# Patient Record
Sex: MALE | Race: WHITE | NOT HISPANIC OR LATINO | Employment: FULL TIME | URBAN - METROPOLITAN AREA
[De-identification: names, ages, dates, MRNs, and addresses within clinical notes are randomized per-mention and may not be internally consistent; named-entity substitution may affect disease eponyms.]

---

## 2018-01-05 ENCOUNTER — OFFICE VISIT (OUTPATIENT)
Dept: URGENT CARE | Facility: CLINIC | Age: 28
End: 2018-01-05

## 2018-01-05 VITALS
TEMPERATURE: 100 F | WEIGHT: 181 LBS | HEIGHT: 69 IN | SYSTOLIC BLOOD PRESSURE: 121 MMHG | HEART RATE: 88 BPM | BODY MASS INDEX: 26.81 KG/M2 | DIASTOLIC BLOOD PRESSURE: 71 MMHG | RESPIRATION RATE: 20 BRPM | OXYGEN SATURATION: 98 %

## 2018-01-05 DIAGNOSIS — R50.9 FEVER AND CHILLS: Primary | ICD-10-CM

## 2018-01-05 DIAGNOSIS — J11.1 INFLUENZA: ICD-10-CM

## 2018-01-05 LAB
CTP QC/QA: YES
FLUAV AG NPH QL: POSITIVE
FLUBV AG NPH QL: NEGATIVE

## 2018-01-05 PROCEDURE — 99203 OFFICE O/P NEW LOW 30 MIN: CPT | Mod: S$GLB,,, | Performed by: EMERGENCY MEDICINE

## 2018-01-05 PROCEDURE — 87804 INFLUENZA ASSAY W/OPTIC: CPT | Mod: 59,QW,S$GLB, | Performed by: EMERGENCY MEDICINE

## 2018-01-05 RX ORDER — BENZONATATE 200 MG/1
200 CAPSULE ORAL 3 TIMES DAILY PRN
Qty: 30 CAPSULE | Refills: 0 | Status: SHIPPED | OUTPATIENT
Start: 2018-01-05 | End: 2018-01-15

## 2018-01-05 RX ORDER — CODEINE PHOSPHATE AND GUAIFENESIN 10; 100 MG/5ML; MG/5ML
10 SOLUTION ORAL EVERY 6 HOURS PRN
Qty: 120 ML | Refills: 0 | Status: SHIPPED | OUTPATIENT
Start: 2018-01-05 | End: 2018-01-15

## 2018-01-05 NOTE — LETTER
January 5, 2018      [facility] [place] [id number] [facility] [address]  Phone: [phone]  Fax: [phone]       Patient: Noel Rodriguez   YOB: 1990  Date of Visit: 01/05/2018    To Whom It May Concern:    Frank Rodriguez  was at [facility] on 01/05/2018. He has the flu and should not return until at least 1/8/18 or no fever for 24 hours. If you have any questions or concerns, or if I can be of further assistance, please do not hesitate to contact me.    Sincerely,    Laya Murguia MD

## 2018-01-05 NOTE — PATIENT INSTRUCTIONS
Please be aware as we discussed that narcotics can be addictive. I have given you a limited quantity to take as it is needed at this time. However take it sparingly and only when needed.  Influenza (Adult)    Influenza is also called the flu. It is a viral illness that affects the air passages of your lungs. It is different from the common cold. The flu can easily be passed from one to person to another. It may be spread through the air by coughing and sneezing. Or it can be spread by touching the sick person and then touching your own eyes, nose, or mouth.  The flu starts 1 to 3 days after you are exposed to the flu virus. It may last for 1 to 2 weeks but many people feel tired or fatigued for many weeks afterward. You usually dont need to take antibiotics unless you have a complication. This might be an ear or sinus infection or pneumonia.  Symptoms of the flu may be mild or severe. They can include extreme tiredness (wanting to stay in bed all day), chills, fevers, muscle aches, soreness with eye movement, headache, and a dry, hacking cough.  Home care  Follow these guidelines when caring for yourself at home:  · Avoid being around cigarette smoke, whether yours or other peoples.  · Acetaminophen or ibuprofen will help ease your fever, muscle aches, and headache. Dont give aspirin to anyone younger than 18 who has the flu. Aspirin can harm the liver.  · Nausea and loss of appetite are common with the flu. Eat light meals. Drink 6 to 8 glasses of liquids every day. Good choices are water, sport drinks, soft drinks without caffeine, juices, tea, and soup. Extra fluids will also help loosen secretions in your nose and lungs.  · Over-the-counter cold medicines will not make the flu go away faster. But the medicines may help with coughing, sore throat, and congestion in your nose and sinuses. Dont use a decongestant if you have high blood pressure.  · Stay home until your fever has been gone for at least 24 hours  without using medicine to reduce fever.  Follow-up care  Follow up with your healthcare provider, or as advised, if you are not getting better over the next week.  If you are age 65 or older, talk with your provider about getting a pneumococcal vaccine every 5 years. You should also get this vaccine if you have chronic asthma or COPD. All adults should get a flu vaccine every fall. Ask your provider about this.  When to seek medical advice  Call your healthcare provider right away if any of these occur:  · Cough with lots of colored mucus (sputum) or blood in your mucus  · Chest pain, shortness of breath, wheezing, or trouble breathing  · Severe headache, or face, neck, or ear pain  · New rash with fever  · Fever of 100.4°F (38°C) or higher, or as directed by your healthcare provider  · Confusion, behavior change, or seizure  · Severe weakness or dizziness  · You get a new fever or cough after getting better for a few days  Date Last Reviewed: 1/1/2017  © 6119-1811 [employer]. [address] [place] [address], [place] [address]. All rights reserved. This information is not intended as a substitute for professional medical care. Always follow your healthcare professional's instructions.

## 2018-01-05 NOTE — PROGRESS NOTES
"Subjective:       Patient ID: Noel Rodriguez is a 27 y.o. male.    Vitals:  height is 5' 9" (1.753 m) and weight is 82.1 kg (181 lb). His oral temperature is 99.7 °F (37.6 °C). His blood pressure is 121/71 and his pulse is 88. His respiration is 20 and oxygen saturation is 98%.     Chief Complaint: Fever; Cough; and Sinus Problem    sxs for 72 hours and no risk factors. Tamiflu talk.       Fever    This is a new problem. The current episode started in the past 7 days. The problem occurs constantly. The problem has been unchanged. The maximum temperature noted was 99 to 99.9 F. The temperature was taken using an oral thermometer. Associated symptoms include congestion, coughing, headaches and a sore throat. Pertinent negatives include no abdominal pain, chest pain, ear pain, nausea or wheezing. He has tried acetaminophen (Dayquil, Nyquil) for the symptoms. The treatment provided no relief.   Cough   This is a new problem. The current episode started in the past 7 days. The problem has been unchanged. The problem occurs every few minutes. The cough is productive of sputum. Associated symptoms include chills, a fever, headaches, myalgias, a sore throat and shortness of breath. Pertinent negatives include no chest pain, ear pain, eye redness or wheezing. Nothing aggravates the symptoms. Treatments tried: Nyquil , Dayquil. The treatment provided no relief.   Sinus Problem   This is a new problem. The current episode started in the past 7 days. The problem is unchanged. There has been no fever (99.7). The fever has been present for less than 1 day. His pain is at a severity of 7/10. The pain is moderate. Associated symptoms include chills, congestion, coughing, headaches, shortness of breath, sneezing and a sore throat. Pertinent negatives include no ear pain or hoarse voice. Treatments tried: Nyquil and Dayquil. The treatment provided mild relief.     Review of Systems   Constitution: Positive for chills and fever. " Negative for malaise/fatigue.   HENT: Positive for congestion, sneezing and sore throat. Negative for ear pain and hoarse voice.    Eyes: Negative for discharge and redness.   Cardiovascular: Negative for chest pain, dyspnea on exertion and leg swelling.   Respiratory: Positive for cough, shortness of breath and sputum production. Negative for wheezing.    Musculoskeletal: Positive for myalgias.   Gastrointestinal: Negative for abdominal pain and nausea.   Neurological: Positive for headaches.       Objective:      Physical Exam   Constitutional: He is oriented to person, place, and time. He appears well-developed and well-nourished. He is cooperative.  Non-toxic appearance. He does not appear ill. No distress.   HENT:   Head: Normocephalic and atraumatic.   Right Ear: Hearing, tympanic membrane, external ear and ear canal normal.   Left Ear: Hearing, tympanic membrane, external ear and ear canal normal.   Nose: Nose normal. No mucosal edema, rhinorrhea or nasal deformity. No epistaxis. Right sinus exhibits no maxillary sinus tenderness and no frontal sinus tenderness. Left sinus exhibits no maxillary sinus tenderness and no frontal sinus tenderness.   Mouth/Throat: Uvula is midline, oropharynx is clear and moist and mucous membranes are normal. No trismus in the jaw. Normal dentition. No uvula swelling. No posterior oropharyngeal erythema.   Eyes: Conjunctivae and lids are normal. No scleral icterus.   Sclera clear bilat   Neck: Trachea normal, full passive range of motion without pain and phonation normal. Neck supple.   Cardiovascular: Normal rate, regular rhythm, normal heart sounds, intact distal pulses and normal pulses.    Pulmonary/Chest: Effort normal and breath sounds normal. No respiratory distress.   Abdominal: Soft. Normal appearance and bowel sounds are normal. He exhibits no distension. There is no tenderness.   Musculoskeletal: Normal range of motion. He exhibits no edema or deformity.    Neurological: He is alert and oriented to person, place, and time. He exhibits normal muscle tone. Coordination normal.   Skin: Skin is warm, dry and intact. He is not diaphoretic. No pallor.   Psychiatric: He has a normal mood and affect. His speech is normal and behavior is normal. Judgment and thought content normal. Cognition and memory are normal.   Nursing note and vitals reviewed.      Office Visit on 01/05/2018   Component Date Value Ref Range Status    Rapid Influenza A Ag 01/05/2018 Positive* Negative Final    Rapid Influenza B Ag 01/05/2018 Negative  Negative Final     Acceptable 01/05/2018 Yes   Final     Assessment:       1. Fever and chills    2. Influenza        Plan:         Fever and chills  -     POCT Influenza A/B    Influenza    Other orders  -     benzonatate (TESSALON) 200 MG capsule; Take 1 capsule (200 mg total) by mouth 3 (three) times daily as needed for Cough.  Dispense: 30 capsule; Refill: 0  -     guaifenesin-codeine 100-10 mg/5 ml (TUSSI-ORGANIDIN NR)  mg/5 mL syrup; Take 10 mLs by mouth every 6 (six) hours as needed for Cough.  Dispense: 120 mL; Refill: 0

## 2019-08-31 ENCOUNTER — OFFICE VISIT (OUTPATIENT)
Dept: URGENT CARE | Facility: CLINIC | Age: 29
End: 2019-08-31
Payer: COMMERCIAL

## 2019-08-31 VITALS
HEIGHT: 69 IN | WEIGHT: 181 LBS | HEART RATE: 77 BPM | RESPIRATION RATE: 18 BRPM | BODY MASS INDEX: 26.81 KG/M2 | TEMPERATURE: 97 F | SYSTOLIC BLOOD PRESSURE: 115 MMHG | DIASTOLIC BLOOD PRESSURE: 71 MMHG | OXYGEN SATURATION: 97 %

## 2019-08-31 DIAGNOSIS — M79.631 PAIN OF RIGHT FOREARM: Primary | ICD-10-CM

## 2019-08-31 DIAGNOSIS — M54.50 ACUTE LEFT-SIDED LOW BACK PAIN WITHOUT SCIATICA: ICD-10-CM

## 2019-08-31 DIAGNOSIS — M25.512 ACUTE PAIN OF LEFT SHOULDER: ICD-10-CM

## 2019-08-31 PROCEDURE — 73090 XR FOREARM RIGHT: ICD-10-PCS | Mod: FY,RT,S$GLB, | Performed by: RADIOLOGY

## 2019-08-31 PROCEDURE — 73090 X-RAY EXAM OF FOREARM: CPT | Mod: FY,RT,S$GLB, | Performed by: RADIOLOGY

## 2019-08-31 PROCEDURE — 99214 OFFICE O/P EST MOD 30 MIN: CPT | Mod: S$GLB,,, | Performed by: PHYSICIAN ASSISTANT

## 2019-08-31 PROCEDURE — 3008F BODY MASS INDEX DOCD: CPT | Mod: CPTII,S$GLB,, | Performed by: PHYSICIAN ASSISTANT

## 2019-08-31 PROCEDURE — 99214 PR OFFICE/OUTPT VISIT, EST, LEVL IV, 30-39 MIN: ICD-10-PCS | Mod: S$GLB,,, | Performed by: PHYSICIAN ASSISTANT

## 2019-08-31 PROCEDURE — 3008F PR BODY MASS INDEX (BMI) DOCUMENTED: ICD-10-PCS | Mod: CPTII,S$GLB,, | Performed by: PHYSICIAN ASSISTANT

## 2019-08-31 RX ORDER — NAPROXEN 500 MG/1
500 TABLET ORAL 2 TIMES DAILY WITH MEALS
Qty: 20 TABLET | Refills: 0 | Status: SHIPPED | OUTPATIENT
Start: 2019-08-31 | End: 2019-09-10

## 2019-08-31 RX ORDER — METHOCARBAMOL 500 MG/1
1000 TABLET, FILM COATED ORAL 4 TIMES DAILY PRN
Qty: 20 TABLET | Refills: 0 | Status: SHIPPED | OUTPATIENT
Start: 2019-08-31 | End: 2023-01-15

## 2019-08-31 NOTE — PROGRESS NOTES
"Subjective:       Patient ID: Neol Rodriguez is a 28 y.o. male.    Vitals:  height is 5' 9" (1.753 m) and weight is 82.1 kg (181 lb). His oral temperature is 97.3 °F (36.3 °C). His blood pressure is 115/71 and his pulse is 77. His respiration is 18 and oxygen saturation is 97%.     Chief Complaint: Arm Pain and Back Pain    Patient present today with right forearm, left shoulder, and left sided low back back pain.     Patient states arm pain start a about a month ago of gradual onset.  No injury or trauma that he knows of.  He is requesting x-ray to make sure that there is no hairline fracture.  He states that he is a  and does lift things a lot.     Back pain started a few days ago also of gradual onset.  Worse with movement-  Twisting, bending, lifting.   No radiation of pain. No incontinence.  No saddle anesthesia/ lower extremity numbness or paresthesia.      Patient also presents with left shoulder pain that began few days ago as well of gradual onset.  He states that it hurts worse to rotate and to abduct.  Denies injury or trauma.  No previous injury or surgery to this  Shoulder.     Arm Pain    The incident occurred more than 1 week ago. There was no injury mechanism. The pain is present in the right forearm. The quality of the pain is described as aching. The pain is at a severity of 6/10. Pertinent negatives include no chest pain or numbness. The symptoms are aggravated by lifting and movement. He has tried nothing for the symptoms.   Back Pain   This is a new problem. The current episode started in the past 7 days. The problem has been gradually worsening since onset. The pain is present in the lumbar spine. The quality of the pain is described as aching and cramping. The pain does not radiate. The pain is at a severity of 9/10. The symptoms are aggravated by lying down and bending. Pertinent negatives include no abdominal pain, bladder incontinence, bowel incontinence, chest pain, dysuria, fever or " numbness. He has tried nothing for the symptoms. The treatment provided no relief.       Constitution: Negative for appetite change, chills, sweating, fatigue, fever and unexpected weight change.   Neck: Negative for neck pain, neck stiffness, neck swelling, degenerative disc disease and bulging disc disease.   Cardiovascular: Negative for chest pain, palpitations and sob on exertion.   Gastrointestinal: Negative for abdominal pain and bowel incontinence.   Genitourinary: Negative for dysuria, urgency, bladder incontinence and hematuria.   Musculoskeletal: Positive for pain and back pain. Negative for trauma, joint pain, joint swelling, abnormal ROM of joint, arthritis, gout, pain with walking, muscle cramps, muscle ache and history of spine disorder.   Skin: Negative for rash.   Neurological: Negative for dizziness, coordination disturbances, numbness and tingling.       Objective:      Physical Exam   Constitutional: He is oriented to person, place, and time. He appears well-developed and well-nourished. He is cooperative.  Non-toxic appearance. He does not appear ill. No distress.   HENT:   Head: Normocephalic and atraumatic. Head is without abrasion, without contusion and without laceration.   Right Ear: Hearing, tympanic membrane, external ear and ear canal normal. No hemotympanum.   Left Ear: Hearing, tympanic membrane, external ear and ear canal normal. No hemotympanum.   Nose: Nose normal. No mucosal edema, rhinorrhea or nasal deformity. No epistaxis. Right sinus exhibits no maxillary sinus tenderness and no frontal sinus tenderness. Left sinus exhibits no maxillary sinus tenderness and no frontal sinus tenderness.   Mouth/Throat: Uvula is midline, oropharynx is clear and moist and mucous membranes are normal. No trismus in the jaw. Normal dentition. No uvula swelling. No posterior oropharyngeal erythema.   Eyes: Pupils are equal, round, and reactive to light. Conjunctivae, EOM and lids are normal. Right eye  exhibits no discharge. Left eye exhibits no discharge. No scleral icterus.   Sclera clear bilat   Neck: Trachea normal, normal range of motion, full passive range of motion without pain and phonation normal. Neck supple. No spinous process tenderness and no muscular tenderness present. No neck rigidity. No tracheal deviation present.   Cardiovascular: Normal rate, regular rhythm, normal heart sounds, intact distal pulses and normal pulses.   Pulmonary/Chest: Effort normal and breath sounds normal. No respiratory distress.   Abdominal: Soft. Normal appearance and bowel sounds are normal. He exhibits no distension, no pulsatile midline mass and no mass. There is no tenderness.   Musculoskeletal: Normal range of motion. He exhibits no edema or deformity.        Right shoulder: Normal.        Left shoulder: He exhibits pain. He exhibits normal range of motion, no tenderness, no bony tenderness, no swelling, no effusion, no crepitus, no deformity, no laceration, no spasm, normal pulse and normal strength.        Thoracic back: Normal.        Lumbar back: He exhibits pain and spasm. He exhibits normal range of motion, no tenderness, no bony tenderness, no swelling, no edema, no deformity, no laceration and normal pulse.        Back:         Arms:  Neurological: He is alert and oriented to person, place, and time. He has normal strength. No cranial nerve deficit or sensory deficit. He exhibits normal muscle tone. He displays no seizure activity. Coordination normal. GCS eye subscore is 4. GCS verbal subscore is 5. GCS motor subscore is 6.   Reflex Scores:       Achilles reflexes are 2+ on the right side and 2+ on the left side.   Sensation bilateral upper and lower extremities intact   Skin: Skin is warm, dry and intact. Capillary refill takes less than 2 seconds. No abrasion, no bruising, no burn, no ecchymosis and no laceration noted. He is not diaphoretic. No pallor.   Psychiatric: He has a normal mood and affect. His  speech is normal and behavior is normal. Judgment and thought content normal. Cognition and memory are normal.   Nursing note and vitals reviewed.        X-ray Forearm Right    Result Date: 8/31/2019  EXAMINATION: XR FOREARM RIGHT CLINICAL HISTORY: Pain in right forearm TECHNIQUE: AP and lateral views of the right forearm were performed. COMPARISON: None FINDINGS: No bone, joint or soft tissue abnormality is identified.     No fracture or dislocation.  No radiopaque foreign bodies. Electronically signed by: Andreia Roblero MD Date:    08/31/2019 Time:    13:46    Assessment:       1. Pain of right forearm    2. Acute pain of left shoulder    3. Acute left-sided low back pain without sciatica        Plan:         Pain of right forearm  -     X-Ray Forearm Right; Future; Expected date: 08/31/2019  -     Ambulatory referral to Orthopedics  -     naproxen (NAPROSYN) 500 MG tablet; Take 1 tablet (500 mg total) by mouth 2 (two) times daily with meals. for 10 days  Dispense: 20 tablet; Refill: 0    Acute pain of left shoulder  -     Ambulatory referral to Orthopedics  -     naproxen (NAPROSYN) 500 MG tablet; Take 1 tablet (500 mg total) by mouth 2 (two) times daily with meals. for 10 days  Dispense: 20 tablet; Refill: 0    Acute left-sided low back pain without sciatica  -     methocarbamol (ROBAXIN) 500 MG Tab; Take 2 tablets (1,000 mg total) by mouth 4 (four) times daily as needed. As needed for muscle spasm.  Dispense: 20 tablet; Refill: 0  -     naproxen (NAPROSYN) 500 MG tablet; Take 1 tablet (500 mg total) by mouth 2 (two) times daily with meals. for 10 days  Dispense: 20 tablet; Refill: 0      Patient Instructions     - Rest.    - Drink plenty of fluids.      - take naproxen twice a day as prescribed with food to help with pain and inflammation.  Do not take over the counter NSAIDs (aleve, advil, ibuprofen) while taking this medication.  You can take Tylenol as directed along with this medication for pain  relief.    -take Robaxin (methocarbamol)  500- 1000 mg up to 4 times daily as prescribed.  This is a muscle relaxer that can help with tension of the muscles around her spine to help reduce pain. This medicine may cause drowsiness.  Do not drive for operate heavy machinery while on this medication    - Ice for 15-20 minutes at a time for the next 24-48 hours.    - Elevate when possible.     -  I suspect that the shoulder and forearm are overuse injuries.  Try to rest as much as possible, although difficult with work.    - No heavy lifting.    - Icing a muscle strain is best for the first 24-48 hours after an injury. After that, low heat to areas of pain for 20 minutes at a time can help.  - Go to the ER for any weakness or sensation loss of the legs, or loss of bowel or bladder control.    - Follow up with your PCP or specialty clinic as directed in the next 1-2 weeks if not improved or as needed.  You can call [phone] to schedule an appointment with  orthopedics  - Go to the ER or seek medical attention immediately if you develop new or worsening symptoms.    - You must understand that you have received an Urgent Care treatment only and that you may be released before all of your medical problems are known or treated.   - You, the patient, will arrange for follow up care as instructed.   - If your condition worsens or fails to improve we recommend that you receive another evaluation at the ER immediately or contact your PCP to discuss your concerns or return here.         Shoulder Impingement Syndrome  The rotator cuff is a group of muscles and tendons that surround the shoulder joint. These muscles and tendons hold the arm in its joint. They help the shoulder move. The rotator cuff muscles and tendons can become irritated from repeated rubbing against the shoulder bone. This is called shoulder impingement syndrome or rotator cuff tendonitis.     If your case is mild, you may only need to rest the shoulder  and then do certain exercises to strengthen the muscles. You can also take anti-inflammatory medicines. Steroid injections into the shoulder can ease inflammation. But you can have only a limited number of these. If the condition gets worse, your shoulder muscles may become thin and weak. This can lead to a rotator cuff tear.  Symptoms of shoulder impingement syndrome may include:  · Shoulder pain that gets worse when you raise your arm overhead  · Weakness of the shoulder muscles when you use your arm overhead  · Popping and clicking when you move your shoulder  · Shoulder pain that wakes you up at night, especially when you sleep on the affected shoulder  · Sudden pain in your shoulder when you lift or reach  Home care  Follow these tips to take care of yourself at home:  · Avoid activities that make your pain worse. These include raising your arms overhead, repeating the same motion over and over, or lifting heavy objects.  · Dont hold your arm in one position for a long time. Keep it moving.  · Put an ice pack on the sore area for 20 minutes every 1 to 2 hours for the first day. You can make an ice pack by putting ice cubes in a plastic bag. Wrap the bag in a towel before putting it on your shoulder. A frozen bag of peas or something similar can also be used as an ice pack. Use the ice packs 3 to 4 times a day for the next 2 days. Continue using the ice to relieve of pain and swelling as needed.  · You may take acetaminophen or ibuprofen to control pain, unless another medicine was prescribed. If prednisone was prescribed, dont take anti-inflammatory medicines. If you have chronic liver or kidney disease or ever had a stomach ulcer or gastrointestinal bleeding, talk with your doctor before using these medicines.  · After your symptoms ease, you may get physical therapy or start a home exercise program. This can strengthen your shoulder muscles and help your range of motion. Talk with your doctor about what is  best for your condition.  Follow-up care  Follow up with your healthcare provider, or as advised.  When to seek medical advice  Call your healthcare provider right away if any of these occur:  · Shoulder pain that gets worse and wakes you up at night  · Your shoulder or arm swells  · Numbness, tingling, or pain that travels down the arm to the hand  · Loss of shoulder strength  · Fever or chills  Date Last Reviewed: 8/1/2016 © 2000-2017 [employer]. [address] [place] [address]. All rights reserved. This information is not intended as a substitute for professional medical care. Always follow your healthcare professional's instructions.        Understanding Lumbosacral Strain    Lumbosacral strain is a medical term for an injury that causes low back pain. The lumbosacral area (low back) is between the bottom of the ribcage and the top of the buttocks. A strain is tearing of muscles and tendons. These tears can be very small but still cause pain.  How a lumbosacral strain happens  Muscles and tendons connected to the spine can be strained in a number of ways:  · Sitting or standing in the same position for long periods of time. This can harm the low back over time. Poor posture can make low back pain more likely.  · Moving the muscles and tendons past their usual range of motion. This can cause a sudden injury. This can happen when you twist, bend over, or lift something heavy. Not using correct technique for sports or tasks like lifting can make back injury more likely.  · Accidents or falls  Lumbosacral strain can be caused by other problems, but these are less common.  Symptoms of lumbosacral strain  Symptoms may include:  · Pain in the back, often on one side  · Pain that gets worse with movement and gets better with rest  · Inability to move as freely as usual  · Swelling, slight redness, and skin warmth in the painful area  Treatment for lumbosacral strain  Low back pain often goes  away by itself within several weeks. But it often comes back. Treatment focuses on reducing pain and avoiding further injury. Bed rest is usually not recommended for low back pain. Treatments may include:  · Avoiding or changing the action that caused the problem. This helps prevent injuring the tissues again.  · Prescription or over-the-counter pain medicines. These help reduce inflammation, swelling, and pain.  · Cold or heat packs. These help reduce pain and swelling.  · Stretching and other exercises. These improve flexibility and strength.  · Physical therapy. This usually includes exercises and other treatments.  · Injections of medicine. This may relieve symptoms.  If these treatments do not relieve symptoms, your healthcare provider may order imaging tests to learn more about the problem. Sometimes you may need surgery.  Possible complications of lumbosacral strain  If the cause of the pain is not addressed, symptoms may return or get worse. Follow your healthcare providers instructions on lifestyle changes and treating your back.     When to call your healthcare provider  Call your healthcare provider right away if you have any of these:  · Fever of 100.4°F (38°C) or higher, or as directed  · Numbness, tingling, or weakness  · Problems with bowel or bladder control, or problems having sex  · Pain that does not go away, or gets worse  · New symptoms   Date Last Reviewed: 3/10/2016  © 4627-2475 [employer]. [address] [place] [address], [place] [address]. All rights reserved. This information is not intended as a substitute for professional medical care. Always follow your healthcare professional's instructions.

## 2019-08-31 NOTE — PATIENT INSTRUCTIONS
- Rest.    - Drink plenty of fluids.      - take naproxen twice a day as prescribed with food to help with pain and inflammation.  Do not take over the counter NSAIDs (aleve, advil, ibuprofen) while taking this medication.  You can take Tylenol as directed along with this medication for pain relief.    -take Robaxin (methocarbamol)  500- 1000 mg up to 4 times daily as prescribed.  This is a muscle relaxer that can help with tension of the muscles around her spine to help reduce pain. This medicine may cause drowsiness.  Do not drive for operate heavy machinery while on this medication    - Ice for 15-20 minutes at a time for the next 24-48 hours.    - Elevate when possible.     -  I suspect that the shoulder and forearm are overuse injuries.  Try to rest as much as possible, although difficult with work.    - No heavy lifting.    - Icing a muscle strain is best for the first 24-48 hours after an injury. After that, low heat to areas of pain for 20 minutes at a time can help.  - Go to the ER for any weakness or sensation loss of the legs, or loss of bowel or bladder control.    - Follow up with your PCP or specialty clinic as directed in the next 1-2 weeks if not improved or as needed.  You can call [phone] to schedule an appointment with  orthopedics  - Go to the ER or seek medical attention immediately if you develop new or worsening symptoms.    - You must understand that you have received an Urgent Care treatment only and that you may be released before all of your medical problems are known or treated.   - You, the patient, will arrange for follow up care as instructed.   - If your condition worsens or fails to improve we recommend that you receive another evaluation at the ER immediately or contact your PCP to discuss your concerns or return here.         Shoulder Impingement Syndrome  The rotator cuff is a group of muscles and tendons that surround the shoulder joint. These muscles and tendons hold the  arm in its joint. They help the shoulder move. The rotator cuff muscles and tendons can become irritated from repeated rubbing against the shoulder bone. This is called shoulder impingement syndrome or rotator cuff tendonitis.     If your case is mild, you may only need to rest the shoulder and then do certain exercises to strengthen the muscles. You can also take anti-inflammatory medicines. Steroid injections into the shoulder can ease inflammation. But you can have only a limited number of these. If the condition gets worse, your shoulder muscles may become thin and weak. This can lead to a rotator cuff tear.  Symptoms of shoulder impingement syndrome may include:  · Shoulder pain that gets worse when you raise your arm overhead  · Weakness of the shoulder muscles when you use your arm overhead  · Popping and clicking when you move your shoulder  · Shoulder pain that wakes you up at night, especially when you sleep on the affected shoulder  · Sudden pain in your shoulder when you lift or reach  Home care  Follow these tips to take care of yourself at home:  · Avoid activities that make your pain worse. These include raising your arms overhead, repeating the same motion over and over, or lifting heavy objects.  · Dont hold your arm in one position for a long time. Keep it moving.  · Put an ice pack on the sore area for 20 minutes every 1 to 2 hours for the first day. You can make an ice pack by putting ice cubes in a plastic bag. Wrap the bag in a towel before putting it on your shoulder. A frozen bag of peas or something similar can also be used as an ice pack. Use the ice packs 3 to 4 times a day for the next 2 days. Continue using the ice to relieve of pain and swelling as needed.  · You may take acetaminophen or ibuprofen to control pain, unless another medicine was prescribed. If prednisone was prescribed, dont take anti-inflammatory medicines. If you have chronic liver or kidney disease or ever had a  stomach ulcer or gastrointestinal bleeding, talk with your doctor before using these medicines.  · After your symptoms ease, you may get physical therapy or start a home exercise program. This can strengthen your shoulder muscles and help your range of motion. Talk with your doctor about what is best for your condition.  Follow-up care  Follow up with your healthcare provider, or as advised.  When to seek medical advice  Call your healthcare provider right away if any of these occur:  · Shoulder pain that gets worse and wakes you up at night  · Your shoulder or arm swells  · Numbness, tingling, or pain that travels down the arm to the hand  · Loss of shoulder strength  · Fever or chills  Date Last Reviewed: 8/1/2016  © 6396-8865 [employer]. [address] [place] [address], [place] [address]. All rights reserved. This information is not intended as a substitute for professional medical care. Always follow your healthcare professional's instructions.        Understanding Lumbosacral Strain    Lumbosacral strain is a medical term for an injury that causes low back pain. The lumbosacral area (low back) is between the bottom of the ribcage and the top of the buttocks. A strain is tearing of muscles and tendons. These tears can be very small but still cause pain.  How a lumbosacral strain happens  Muscles and tendons connected to the spine can be strained in a number of ways:  · Sitting or standing in the same position for long periods of time. This can harm the low back over time. Poor posture can make low back pain more likely.  · Moving the muscles and tendons past their usual range of motion. This can cause a sudden injury. This can happen when you twist, bend over, or lift something heavy. Not using correct technique for sports or tasks like lifting can make back injury more likely.  · Accidents or falls  Lumbosacral strain can be caused by other problems, but these are less common.  Symptoms of lumbosacral  strain  Symptoms may include:  · Pain in the back, often on one side  · Pain that gets worse with movement and gets better with rest  · Inability to move as freely as usual  · Swelling, slight redness, and skin warmth in the painful area  Treatment for lumbosacral strain  Low back pain often goes away by itself within several weeks. But it often comes back. Treatment focuses on reducing pain and avoiding further injury. Bed rest is usually not recommended for low back pain. Treatments may include:  · Avoiding or changing the action that caused the problem. This helps prevent injuring the tissues again.  · Prescription or over-the-counter pain medicines. These help reduce inflammation, swelling, and pain.  · Cold or heat packs. These help reduce pain and swelling.  · Stretching and other exercises. These improve flexibility and strength.  · Physical therapy. This usually includes exercises and other treatments.  · Injections of medicine. This may relieve symptoms.  If these treatments do not relieve symptoms, your healthcare provider may order imaging tests to learn more about the problem. Sometimes you may need surgery.  Possible complications of lumbosacral strain  If the cause of the pain is not addressed, symptoms may return or get worse. Follow your healthcare providers instructions on lifestyle changes and treating your back.     When to call your healthcare provider  Call your healthcare provider right away if you have any of these:  · Fever of 100.4°F (38°C) or higher, or as directed  · Numbness, tingling, or weakness  · Problems with bowel or bladder control, or problems having sex  · Pain that does not go away, or gets worse  · New symptoms   Date Last Reviewed: 3/10/2016  © 7567-9260 [employer]. [address] [place] [address], [place] [address]. All rights reserved. This information is not intended as a substitute for professional medical care. Always follow your healthcare professional's  instructions.

## 2020-02-17 ENCOUNTER — OFFICE VISIT (OUTPATIENT)
Dept: URGENT CARE | Facility: CLINIC | Age: 30
End: 2020-02-17
Payer: COMMERCIAL

## 2020-02-17 ENCOUNTER — HOSPITAL ENCOUNTER (EMERGENCY)
Facility: OTHER | Age: 30
Discharge: HOME | End: 2020-02-17
Attending: EMERGENCY MEDICINE
Payer: COMMERCIAL

## 2020-02-17 VITALS
WEIGHT: 180 LBS | DIASTOLIC BLOOD PRESSURE: 76 MMHG | HEIGHT: 69 IN | OXYGEN SATURATION: 98 % | HEART RATE: 69 BPM | RESPIRATION RATE: 18 BRPM | SYSTOLIC BLOOD PRESSURE: 125 MMHG | BODY MASS INDEX: 26.66 KG/M2 | TEMPERATURE: 98 F

## 2020-02-17 VITALS
RESPIRATION RATE: 16 BRPM | WEIGHT: 186 LBS | SYSTOLIC BLOOD PRESSURE: 132 MMHG | OXYGEN SATURATION: 98 % | HEART RATE: 86 BPM | DIASTOLIC BLOOD PRESSURE: 81 MMHG | BODY MASS INDEX: 27.55 KG/M2 | TEMPERATURE: 99 F | HEIGHT: 69 IN

## 2020-02-17 DIAGNOSIS — R10.9 ABDOMINAL PAIN, UNSPECIFIED ABDOMINAL LOCATION: ICD-10-CM

## 2020-02-17 DIAGNOSIS — R10.31 RIGHT LOWER QUADRANT ABDOMINAL PAIN: Primary | ICD-10-CM

## 2020-02-17 DIAGNOSIS — R11.2 NON-INTRACTABLE VOMITING WITH NAUSEA, UNSPECIFIED VOMITING TYPE: Primary | ICD-10-CM

## 2020-02-17 DIAGNOSIS — R11.0 NAUSEA: ICD-10-CM

## 2020-02-17 LAB
ALBUMIN SERPL BCP-MCNC: 4.4 G/DL (ref 3.5–5.2)
ALP SERPL-CCNC: 51 U/L (ref 55–135)
ALT SERPL W/O P-5'-P-CCNC: 25 U/L (ref 10–44)
ANION GAP SERPL CALC-SCNC: 7 MMOL/L (ref 8–16)
ANISOCYTOSIS BLD QL SMEAR: SLIGHT
AST SERPL-CCNC: 15 U/L (ref 10–40)
BASOPHILS # BLD AUTO: 0.06 K/UL (ref 0–0.2)
BASOPHILS NFR BLD: 0.8 % (ref 0–1.9)
BILIRUB SERPL-MCNC: 0.9 MG/DL (ref 0.1–1)
BILIRUB UR QL STRIP: NEGATIVE
BUN SERPL-MCNC: 9 MG/DL (ref 6–20)
CALCIUM SERPL-MCNC: 9.3 MG/DL (ref 8.7–10.5)
CHLORIDE SERPL-SCNC: 105 MMOL/L (ref 95–110)
CLARITY UR: CLEAR
CO2 SERPL-SCNC: 29 MMOL/L (ref 23–29)
COLOR UR: YELLOW
CREAT SERPL-MCNC: 1 MG/DL (ref 0.5–1.4)
DACRYOCYTES BLD QL SMEAR: NORMAL
DIFFERENTIAL METHOD: NORMAL
EOSINOPHIL # BLD AUTO: 0.2 K/UL (ref 0–0.5)
EOSINOPHIL NFR BLD: 2.8 % (ref 0–8)
ERYTHROCYTE [DISTWIDTH] IN BLOOD BY AUTOMATED COUNT: 12.6 % (ref 11.5–14.5)
EST. GFR  (AFRICAN AMERICAN): >60 ML/MIN/1.73 M^2
EST. GFR  (NON AFRICAN AMERICAN): >60 ML/MIN/1.73 M^2
GLUCOSE SERPL-MCNC: 97 MG/DL (ref 70–110)
GLUCOSE UR QL STRIP: NEGATIVE
HCT VFR BLD AUTO: 48 % (ref 40–54)
HGB BLD-MCNC: 16.7 G/DL (ref 14–18)
HGB UR QL STRIP: NEGATIVE
IMM GRANULOCYTES # BLD AUTO: 0.02 K/UL (ref 0–0.04)
IMM GRANULOCYTES NFR BLD AUTO: 0.3 % (ref 0–0.5)
KETONES UR QL STRIP: NEGATIVE
LEUKOCYTE ESTERASE UR QL STRIP: NEGATIVE
LYMPHOCYTES # BLD AUTO: 1.6 K/UL (ref 1–4.8)
LYMPHOCYTES NFR BLD: 21.2 % (ref 18–48)
MCH RBC QN AUTO: 30.4 PG (ref 27–31)
MCHC RBC AUTO-ENTMCNC: 34.8 G/DL (ref 32–36)
MCV RBC AUTO: 87 FL (ref 82–98)
MONOCYTES # BLD AUTO: 0.5 K/UL (ref 0.3–1)
MONOCYTES NFR BLD: 6.5 % (ref 4–15)
NEUTROPHILS # BLD AUTO: 5.3 K/UL (ref 1.8–7.7)
NEUTROPHILS NFR BLD: 68.4 % (ref 38–73)
NITRITE UR QL STRIP: NEGATIVE
NRBC BLD-RTO: 0 /100 WBC
PH UR STRIP: 6 [PH] (ref 5–8)
PLATELET # BLD AUTO: 240 K/UL (ref 150–350)
PLATELET BLD QL SMEAR: NORMAL
PMV BLD AUTO: 9.5 FL (ref 9.2–12.9)
POIKILOCYTOSIS BLD QL SMEAR: SLIGHT
POTASSIUM SERPL-SCNC: 4.6 MMOL/L (ref 3.5–5.1)
PROT SERPL-MCNC: 7.7 G/DL (ref 6–8.4)
PROT UR QL STRIP: NEGATIVE
RBC # BLD AUTO: 5.49 M/UL (ref 4.6–6.2)
SODIUM SERPL-SCNC: 141 MMOL/L (ref 136–145)
SP GR UR STRIP: 1.02 (ref 1–1.03)
URN SPEC COLLECT METH UR: NORMAL
UROBILINOGEN UR STRIP-ACNC: NEGATIVE EU/DL
WBC # BLD AUTO: 7.73 K/UL (ref 3.9–12.7)

## 2020-02-17 PROCEDURE — 25500020 PHARM REV CODE 255: Performed by: EMERGENCY MEDICINE

## 2020-02-17 PROCEDURE — 99285 EMERGENCY DEPT VISIT HI MDM: CPT | Mod: 25

## 2020-02-17 PROCEDURE — 25000003 PHARM REV CODE 250: Performed by: PHYSICIAN ASSISTANT

## 2020-02-17 PROCEDURE — S0028 INJECTION, FAMOTIDINE, 20 MG: HCPCS | Performed by: EMERGENCY MEDICINE

## 2020-02-17 PROCEDURE — 81003 URINALYSIS AUTO W/O SCOPE: CPT

## 2020-02-17 PROCEDURE — 25000003 PHARM REV CODE 250: Performed by: EMERGENCY MEDICINE

## 2020-02-17 PROCEDURE — 36415 COLL VENOUS BLD VENIPUNCTURE: CPT

## 2020-02-17 PROCEDURE — 80053 COMPREHEN METABOLIC PANEL: CPT

## 2020-02-17 PROCEDURE — 96361 HYDRATE IV INFUSION ADD-ON: CPT

## 2020-02-17 PROCEDURE — 99213 PR OFFICE/OUTPT VISIT, EST, LEVL III, 20-29 MIN: ICD-10-PCS | Mod: S$GLB,,, | Performed by: NURSE PRACTITIONER

## 2020-02-17 PROCEDURE — 96374 THER/PROPH/DIAG INJ IV PUSH: CPT | Mod: 59

## 2020-02-17 PROCEDURE — 99213 OFFICE O/P EST LOW 20 MIN: CPT | Mod: S$GLB,,, | Performed by: NURSE PRACTITIONER

## 2020-02-17 PROCEDURE — 63600175 PHARM REV CODE 636 W HCPCS: Performed by: PHYSICIAN ASSISTANT

## 2020-02-17 PROCEDURE — 85025 COMPLETE CBC W/AUTO DIFF WBC: CPT

## 2020-02-17 RX ORDER — ONDANSETRON 4 MG/1
4 TABLET, ORALLY DISINTEGRATING ORAL
Status: COMPLETED | OUTPATIENT
Start: 2020-02-17 | End: 2020-02-17

## 2020-02-17 RX ORDER — FAMOTIDINE 10 MG/ML
20 INJECTION INTRAVENOUS
Status: COMPLETED | OUTPATIENT
Start: 2020-02-17 | End: 2020-02-17

## 2020-02-17 RX ORDER — ONDANSETRON 4 MG/1
4 TABLET, ORALLY DISINTEGRATING ORAL EVERY 8 HOURS PRN
Qty: 15 TABLET | Refills: 0 | Status: SHIPPED | OUTPATIENT
Start: 2020-02-17 | End: 2023-01-15

## 2020-02-17 RX ADMIN — SODIUM CHLORIDE 1000 ML: 0.9 INJECTION, SOLUTION INTRAVENOUS at 01:02

## 2020-02-17 RX ADMIN — FAMOTIDINE 20 MG: 10 INJECTION, SOLUTION INTRAVENOUS at 02:02

## 2020-02-17 RX ADMIN — IOHEXOL 100 ML: 350 INJECTION, SOLUTION INTRAVENOUS at 03:02

## 2020-02-17 RX ADMIN — ONDANSETRON 4 MG: 4 TABLET, ORALLY DISINTEGRATING ORAL at 01:02

## 2020-02-17 NOTE — PROGRESS NOTES
"Subjective:       Patient ID: Noel Rodriguez is a 29 y.o. male.    Vitals:  height is 5' 9" (1.753 m) and weight is 84.4 kg (186 lb). His temperature is 98.8 °F (37.1 °C). His blood pressure is 132/81 and his pulse is 86. His respiration is 16 and oxygen saturation is 98%.     Chief Complaint: Nausea    Patient c/o fatigue, chills, nausea,abdominal pain in the upper middle quadrant. No medication taken.     Provider note begins below:    3 days of nausea and vomiting and abdominal pain.  States roommate recently ill with viral illness. Fever x 2 days, but none today.  No blood or mucus in stool or emesis.  No previous Hx of abd disorder or surgery.  Feels weak but no syncopal/presyncopal episodes.     Nausea   Associated symptoms include abdominal pain, chills, fatigue, a fever, nausea and vomiting (total 3x). Pertinent negatives include no arthralgias, chest pain, congestion, coughing, headaches, joint swelling, myalgias, rash, sore throat or vertigo.       Constitution: Positive for chills, fatigue and fever.   HENT: Negative for congestion and sore throat.    Neck: Negative for painful lymph nodes.   Cardiovascular: Negative for chest pain and leg swelling.   Eyes: Negative for double vision and blurred vision.   Respiratory: Negative for cough and shortness of breath.    Gastrointestinal: Positive for abdominal pain, nausea, vomiting (total 3x) and diarrhea (brown).   Genitourinary: Negative for dysuria, frequency and urgency.   Musculoskeletal: Negative for joint pain, joint swelling, muscle cramps and muscle ache.   Skin: Negative for color change, pale and rash.   Allergic/Immunologic: Negative for seasonal allergies.   Neurological: Negative for dizziness, history of vertigo, light-headedness, passing out and headaches.   Hematologic/Lymphatic: Negative for swollen lymph nodes, easy bruising/bleeding and history of blood clots. Does not bruise/bleed easily.   Psychiatric/Behavioral: Negative for " nervous/anxious, sleep disturbance and depression. The patient is not nervous/anxious.        Objective:      Physical Exam   Constitutional: He is oriented to person, place, and time. He appears well-developed and well-nourished.   HENT:   Head: Normocephalic and atraumatic.   Right Ear: External ear normal.   Left Ear: External ear normal.   Nose: Nose normal.   Mouth/Throat: Mucous membranes are normal.   Eyes: Conjunctivae and lids are normal.   Neck: Trachea normal and full passive range of motion without pain. Neck supple.   Cardiovascular: Normal rate, regular rhythm and normal heart sounds.   Pulmonary/Chest: Effort normal and breath sounds normal. No respiratory distress.   Abdominal: Soft. Normal appearance and bowel sounds are normal. He exhibits no distension, no abdominal bruit, no pulsatile midline mass and no mass. There is tenderness in the right lower quadrant. There is rebound and guarding. There is no rigidity.       Musculoskeletal: Normal range of motion. He exhibits no edema.   Neurological: He is alert and oriented to person, place, and time. He has normal strength.   Skin: Skin is warm, dry, intact, not diaphoretic and not pale.   Psychiatric: He has a normal mood and affect. His speech is normal and behavior is normal. Judgment and thought content normal. Cognition and memory are normal.   Nursing note and vitals reviewed.        Assessment:       1. Right lower quadrant abdominal pain    2. Nausea        Plan:       Patient ambulatory and in NAD at time of discharge.      I discussed the disposition of this patient with the medical director, Dr. Castellanos and it was agreed CT imaging needed in ED setting.    Right lower quadrant abdominal pain  -     Refer to Emergency Dept.    Nausea  -     Refer to Emergency Dept.      Patient Instructions     Please go immediately to the Emergency Department as we discussed. Do NOT delay exam and treatment.    Abdominal Pain, Possible Appendicitis, Repeat  Exam, Male     Based on your visit today, the exact cause of your abdominal (stomach) pain is not certain. You have some of the early symptoms of appendicitis. Because these symptoms can be similar to other stomach problems, however, the diagnosis cannot be made at this time.  Waiting for more time to pass and repeating the exam is the best way to find out whether you have appendicitis. Within the next 12 to 24 hours, the cause of your stomach pain should become clear. During this time, you need to watch for any new symptoms or worsening of your condition. (See below.)  Symptoms  The most common symptom of appendicitis is pain in the abdomen. Since the appendix is in the lower right part of the abdomen, that is the most common place to have pain. Typically, the pain starts near the navel (belly button) and then moves lower and to the right over hours. The pain also tends to worsen over time. It may hurt especially when moving, straining, or coughing.  Other symptoms include:  · Loss of appetite  · Nausea, vomiting  · Low-grade fever  · Constipation or diarrhea  · Not passing gas  Home care  · Rest until your next exam. No lifting, straining, or strenuous activities.  · You may be told not to eat or drink anything before your next exam. Be sure to follow any instructions youre given. If you are allowed to eat:  ¨ Eat a diet low in fiber (called a low-residue diet). Foods allowed include refined breads, white rice, fruit and vegetable juices without pulp, and tender meats. These foods will pass more easily through the colon.  ¨ Avoid whole-grain foods, whole fruits and vegetables, meats, seeds and nuts, fried or fatty foods, dairy, and alcohol and spicy foods.   Follow-up care  Return for your next exam as directed.  When to seek medical advice  Call your healthcare provider or seek treatment right away if:  · You have a fever of 100.4°F (38°C) or higher, or as directed by your provider.  · Your pain gets worse or  moves to the lower right part of your abdomen.  · You have nausea or vomiting that worsens.  · You have diarrhea or constipation that worsens.  · You have blood in your vomit or stool (dark red or black color).  · Your abdomen becomes swollen.  · You become weak or dizzy.  Call 911  Call 911 right away if:  · You have trouble breathing.  · You become very confused or sleepy.  · You feel faint or lose consciousness.  · You have an usually fast heart rate.  Date Last Reviewed: 6/24/2015  © 9463-7869 [employer]. [address] [place] [id number]. All rights reserved. This information is not intended as a substitute for professional medical care. Always follow your healthcare professional's instructions.

## 2020-02-17 NOTE — PATIENT INSTRUCTIONS
Please go immediately to the Emergency Department as we discussed. Do NOT delay exam and treatment.    Abdominal Pain, Possible Appendicitis, Repeat Exam, Male     Based on your visit today, the exact cause of your abdominal (stomach) pain is not certain. You have some of the early symptoms of appendicitis. Because these symptoms can be similar to other stomach problems, however, the diagnosis cannot be made at this time.  Waiting for more time to pass and repeating the exam is the best way to find out whether you have appendicitis. Within the next 12 to 24 hours, the cause of your stomach pain should become clear. During this time, you need to watch for any new symptoms or worsening of your condition. (See below.)  Symptoms  The most common symptom of appendicitis is pain in the abdomen. Since the appendix is in the lower right part of the abdomen, that is the most common place to have pain. Typically, the pain starts near the navel (belly button) and then moves lower and to the right over hours. The pain also tends to worsen over time. It may hurt especially when moving, straining, or coughing.  Other symptoms include:  · Loss of appetite  · Nausea, vomiting  · Low-grade fever  · Constipation or diarrhea  · Not passing gas  Home care  · Rest until your next exam. No lifting, straining, or strenuous activities.  · You may be told not to eat or drink anything before your next exam. Be sure to follow any instructions youre given. If you are allowed to eat:  ¨ Eat a diet low in fiber (called a low-residue diet). Foods allowed include refined breads, white rice, fruit and vegetable juices without pulp, and tender meats. These foods will pass more easily through the colon.  ¨ Avoid whole-grain foods, whole fruits and vegetables, meats, seeds and nuts, fried or fatty foods, dairy, and alcohol and spicy foods.   Follow-up care  Return for your next exam as directed.  When to seek medical advice  Call your healthcare  provider or seek treatment right away if:  · You have a fever of 100.4°F (38°C) or higher, or as directed by your provider.  · Your pain gets worse or moves to the lower right part of your abdomen.  · You have nausea or vomiting that worsens.  · You have diarrhea or constipation that worsens.  · You have blood in your vomit or stool (dark red or black color).  · Your abdomen becomes swollen.  · You become weak or dizzy.  Call 911  Call 911 right away if:  · You have trouble breathing.  · You become very confused or sleepy.  · You feel faint or lose consciousness.  · You have an usually fast heart rate.  Date Last Reviewed: 6/24/2015  © 0195-7260 [employer]. [address] [place] [address], [place] [address]. All rights reserved. This information is not intended as a substitute for professional medical care. Always follow your healthcare professional's instructions.

## 2020-02-17 NOTE — ED NOTES
Appearance: Pt awake, alert & oriented to person, place & time. Pt in no acute distress at present time. Pt is clean and well groomed with clothes appropriately fastened.   Skin: Skin warm, dry & intact. Color consistent with ethnicity. Mucous membranes moist. No breakdown or brusing noted.   Musculoskeletal: Patient moving all extremities well, no obvious swelling or deformities noted.   Respiratory: Respirations spontaneous, even, and non-labored. Visible chest rise noted. Airway is open and patent. No accessory muscle use noted.   Neurologic: Sensation is intact. Speech is clear and appropriate. Eyes open spontaneously, behavior appropriate to situation, follows commands,  purposeful motor response noted.   Cardiac:  No Bilateral lower extremity edema. Cap refill is <3 seconds. Pt denies active chest pains, SOB, dizziness, blurred vision, weakness or fatigue at this time.   Abdomen: TTP to RUQ, RLQ, and LLQ. ABD non distended. No active vomiting.   : Pt reports no dysuria or hematuria.

## 2020-02-17 NOTE — ED PROVIDER NOTES
Encounter Date: 2/17/2020    SCRIBE #1 NOTE: I, Chely Baez, am scribing for, and in the presence of, Dr. Franklin .       History     Chief Complaint   Patient presents with    Abdominal Pain     +Pt reporting abdominal pain with N/V and fever x3-4 days, sent from  for r/o appendicitis.      Time seen by provider: 4:44 PM    This is a 29 y.o. healthy male who presents with complaint of nausea since 3 days ago and associated diarrhea. He reports 3 episodes of vomiting over the past 2 days with right sided abdominal pain. He has possible sick contacts with stomach flu at his job. He went to Urgent Care today and due to his right sided abdominal tenderness the became concerned for a possible appendicitis and told him to report here to get checked out.     The history is provided by the patient.     Review of patient's allergies indicates:  No Known Allergies  History reviewed. No pertinent past medical history.  History reviewed. No pertinent surgical history.  History reviewed. No pertinent family history.  Social History     Tobacco Use    Smoking status: Former Smoker    Smokeless tobacco: Never Used   Substance Use Topics    Alcohol use: Not Currently    Drug use: Not Currently     Review of Systems   Constitutional: Negative for fever.   HENT: Negative for sore throat.    Respiratory: Negative for shortness of breath.    Cardiovascular: Negative for chest pain.   Gastrointestinal: Positive for abdominal pain, diarrhea, nausea and vomiting.   Genitourinary: Negative for dysuria.   Musculoskeletal: Negative for back pain.   Skin: Negative for rash.   Neurological: Negative for weakness.   Hematological: Does not bruise/bleed easily.   All other systems reviewed and are negative.      Physical Exam     Initial Vitals [02/17/20 1112]   BP Pulse Resp Temp SpO2   135/72 77 18 97.6 °F (36.4 °C) 99 %      MAP       --         Physical Exam    Nursing note and vitals reviewed.  Constitutional: He appears  well-developed and well-nourished.   HENT:   Head: Normocephalic and atraumatic.   Eyes: Conjunctivae and EOM are normal. Pupils are equal, round, and reactive to light.   Neck: Normal range of motion. Neck supple.   Cardiovascular: Normal rate, regular rhythm, normal heart sounds and intact distal pulses. Exam reveals no gallop and no friction rub.    No murmur heard.  Pulmonary/Chest: Breath sounds normal. No stridor. No respiratory distress. He has no wheezes. He has no rhonchi. He has no rales. He exhibits no tenderness.   Abdominal: Soft. Bowel sounds are normal. He exhibits no distension. There is tenderness (diffused right sided ). There is no rebound, no guarding and negative Powell's sign.   Musculoskeletal: Normal range of motion.   Neurological: He is alert and oriented to person, place, and time. He has normal strength. No cranial nerve deficit. GCS score is 15. GCS eye subscore is 4. GCS verbal subscore is 5. GCS motor subscore is 6.   Skin: Skin is warm and dry. Capillary refill takes less than 2 seconds.   Psychiatric: He has a normal mood and affect.         ED Course   Procedures  Labs Reviewed   COMPREHENSIVE METABOLIC PANEL - Abnormal; Notable for the following components:       Result Value    Alkaline Phosphatase 51 (*)     Anion Gap 7 (*)     All other components within normal limits   URINALYSIS, REFLEX TO URINE CULTURE    Narrative:     Preferred Collection Type->Urine, Clean Catch   CBC W/ AUTO DIFFERENTIAL          Imaging Results          CT Abdomen Pelvis With Contrast (Final result)  Result time 02/17/20 15:43:09    Final result by Andriy Davila MD (02/17/20 15:43:09)                 Impression:      1. No acute abnormality.  2. Small fat containing umbilical hernia.  3. Probable tiny subcentimeter hepatic cyst in the right lobe.      Electronically signed by: Andriy Davila  Date:    02/17/2020  Time:    15:43             Narrative:    EXAMINATION:  CT ABDOMEN PELVIS WITH  CONTRAST    CLINICAL HISTORY:  RLQ pain, appendicitis suspected;    TECHNIQUE:  Low dose axial images, sagittal and coronal reformations were obtained from the lung bases to the pubic symphysis following the IV administration of 100 mL of Omnipaque 350 .  Oral contrast was not administered.    COMPARISON:  None.    FINDINGS:  Abdomen:    - Lower thorax:    - Lung bases: No infiltrates and no nodules.    - Liver: Probable tiny subcentimeter hypodense cyst in the right lobe superiorly.  Limited visualization.    - Gallbladder: No calcified gallstones.    - Bile Ducts: No evidence of intra or extra hepatic biliary ductal dilation.    - Spleen: Negative.    - Kidneys: No mass or hydronephrosis.    - Adrenals: Unremarkable.    - Pancreas: No mass or peripancreatic fat stranding.    - Retroperitoneum:  No significant adenopathy.    - Vascular: No abdominal aortic aneurysm.    - Abdominal wall:  Small fat containing umbilical hernia.    The appendix is within normal limits.    Pelvis:    No pelvic mass, adenopathy, or free fluid.    Urinary bladder is within normal limits.    Bowel/Mesentery:    No evidence of bowel obstruction or inflammation.    Bones:  No acute osseous abnormality and no suspicious lytic or blastic lesion.                                 Medical Decision Making:   History:   Old Medical Records: I decided to obtain old medical records.  Initial Assessment:       Healthy 29-year-old male sent from urgent Care for evaluation of nausea/vomiting/diarrhea with right-sided abdominal pain that started 3 days ago.  No suspected food poisoning at onset, the patient has had multiple sick contacts with a stomach virus.  He initially had nausea and diarrhea, but vomited 3 times over the past 2 days, though none today.  He is currently able to tolerate liquids but developed some right-sided abdominal pain since onset; initially was evaluated urgent care but sent here over concern for appendicitis.   On exam he does  have mild right sided abdominal tenderness, right lower quadrant worse than right upper quadrant.  Symptoms not consistent with biliary colic and no previous similar episodes.   Most likely viral gastroenteritis, less likely acute cholecystitis or acute appendicitis, will check labs and CT.      After IVF and Zofran patient feels much better and is tolerating solid without difficulty.  Basic labs with no acute findings, no elevated WBC or LFTs.  CT with no acute findings or sign of appendicitis.  Patient stable for outpatient management of viral gastroenteritis since she is tolerating p.o. with reassuring workup.  Will discharge with Zofran p.r.n., and he is advised to take Pepcid b.i.d. and bland diet, with close PCP follow-up and return precautions for any worsening symptoms.      Clinical Tests:   Lab Tests: Ordered and Reviewed            Scribe Attestation:   Scribe #1: I performed the above scribed service and the documentation accurately describes the services I performed. I attest to the accuracy of the note.    Attending Attestation:           Physician Attestation for Scribe:  Physician Attestation Statement for Scribe #1: I, Dr. Franklin , reviewed documentation, as scribed by Chely Baez  in my presence, and it is both accurate and complete.                 ED Course as of Feb 17 1643   Mon Feb 17, 2020   1210 Noel Rodriguez, 29 y.o.  presented to the ED complaining of RLQ pain since Friday morning. Nausea, vomiting fever. Went to urgent care this am and sent in for CT scan.     Patient seen and medically screened by myself in the Provider in Triage Sort system due to ED crowding.  Appropriate tests and/or medications ordered.  A medical screening exam has been performed.  The care will be assumed by myself or another provider when treatment space becomes available.  I am not assuming care of this patient at this time. 12:10 PM. AMANDA CARBAJAL        [NT]      ED Course User Index  [NT] Sindi Allen  AMANDA                Clinical Impression:     1. Non-intractable vomiting with nausea, unspecified vomiting type    2. Abdominal pain, unspecified abdominal location                                Malick Franklin MD  02/18/20 2016

## 2020-02-17 NOTE — ED NOTES
ROUNDING:  Patient sitting up on exam table, AAOx4, Denies pain/any other discomfort at this time,Skin is warm and dry, Resp. even and unlabored, Comfort and BR needs addressed, Plan of care updated, NADN, Family member at bedside, Call light within reach. Will continue to monitor.

## 2020-02-17 NOTE — ED NOTES
Pt sent from , sent for possible appendicitis. C/o pain to R side ABD x several days with nausea, vomiting, fevers. Pt AAOx4 and appropriate at this time. Respirations even and unlabored. No acute distress noted. Last meal yesterday x 11 am.

## 2020-02-22 ENCOUNTER — TELEPHONE (OUTPATIENT)
Dept: URGENT CARE | Facility: CLINIC | Age: 30
End: 2020-02-22

## 2023-01-15 ENCOUNTER — OFFICE VISIT (OUTPATIENT)
Dept: URGENT CARE | Facility: CLINIC | Age: 33
End: 2023-01-15
Payer: COMMERCIAL

## 2023-01-15 VITALS
HEIGHT: 69 IN | OXYGEN SATURATION: 98 % | BODY MASS INDEX: 26.66 KG/M2 | TEMPERATURE: 98 F | DIASTOLIC BLOOD PRESSURE: 85 MMHG | HEART RATE: 87 BPM | SYSTOLIC BLOOD PRESSURE: 128 MMHG | RESPIRATION RATE: 18 BRPM | WEIGHT: 180 LBS

## 2023-01-15 DIAGNOSIS — M25.511 RIGHT SHOULDER PAIN, UNSPECIFIED CHRONICITY: ICD-10-CM

## 2023-01-15 DIAGNOSIS — Z76.89 ENCOUNTER TO ESTABLISH CARE: ICD-10-CM

## 2023-01-15 DIAGNOSIS — R05.9 COUGH, UNSPECIFIED TYPE: ICD-10-CM

## 2023-01-15 DIAGNOSIS — J06.9 VIRAL URI WITH COUGH: Primary | ICD-10-CM

## 2023-01-15 DIAGNOSIS — S46.011A STRAIN OF RIGHT ROTATOR CUFF CAPSULE, INITIAL ENCOUNTER: ICD-10-CM

## 2023-01-15 DIAGNOSIS — J02.9 SORE THROAT: ICD-10-CM

## 2023-01-15 DIAGNOSIS — M79.641 PAIN OF RIGHT HAND: ICD-10-CM

## 2023-01-15 LAB
CTP QC/QA: YES
MOLECULAR STREP A: NEGATIVE
POC MOLECULAR INFLUENZA A AGN: NEGATIVE
POC MOLECULAR INFLUENZA B AGN: NEGATIVE
SARS-COV-2 AG RESP QL IA.RAPID: NEGATIVE

## 2023-01-15 PROCEDURE — 87502 POCT INFLUENZA A/B MOLECULAR: ICD-10-PCS | Mod: QW,S$GLB,, | Performed by: FAMILY MEDICINE

## 2023-01-15 PROCEDURE — 3079F PR MOST RECENT DIASTOLIC BLOOD PRESSURE 80-89 MM HG: ICD-10-PCS | Mod: CPTII,S$GLB,, | Performed by: FAMILY MEDICINE

## 2023-01-15 PROCEDURE — 3074F PR MOST RECENT SYSTOLIC BLOOD PRESSURE < 130 MM HG: ICD-10-PCS | Mod: CPTII,S$GLB,, | Performed by: FAMILY MEDICINE

## 2023-01-15 PROCEDURE — 73030 X-RAY EXAM OF SHOULDER: CPT | Mod: RT,S$GLB,, | Performed by: RADIOLOGY

## 2023-01-15 PROCEDURE — 73130 X-RAY EXAM OF HAND: CPT | Mod: RT,S$GLB,, | Performed by: RADIOLOGY

## 2023-01-15 PROCEDURE — 3079F DIAST BP 80-89 MM HG: CPT | Mod: CPTII,S$GLB,, | Performed by: FAMILY MEDICINE

## 2023-01-15 PROCEDURE — 1160F RVW MEDS BY RX/DR IN RCRD: CPT | Mod: CPTII,S$GLB,, | Performed by: FAMILY MEDICINE

## 2023-01-15 PROCEDURE — 87651 STREP A DNA AMP PROBE: CPT | Mod: QW,S$GLB,, | Performed by: FAMILY MEDICINE

## 2023-01-15 PROCEDURE — 87811 SARS-COV-2 COVID19 W/OPTIC: CPT | Mod: QW,S$GLB,, | Performed by: FAMILY MEDICINE

## 2023-01-15 PROCEDURE — 73130 XR HAND COMPLETE 3 VIEW RIGHT: ICD-10-PCS | Mod: RT,S$GLB,, | Performed by: RADIOLOGY

## 2023-01-15 PROCEDURE — 99215 OFFICE O/P EST HI 40 MIN: CPT | Mod: S$GLB,,, | Performed by: FAMILY MEDICINE

## 2023-01-15 PROCEDURE — 3074F SYST BP LT 130 MM HG: CPT | Mod: CPTII,S$GLB,, | Performed by: FAMILY MEDICINE

## 2023-01-15 PROCEDURE — 73030 XR SHOULDER COMPLETE 2 OR MORE VIEWS RIGHT: ICD-10-PCS | Mod: RT,S$GLB,, | Performed by: RADIOLOGY

## 2023-01-15 PROCEDURE — 99215 PR OFFICE/OUTPT VISIT, EST, LEVL V, 40-54 MIN: ICD-10-PCS | Mod: S$GLB,,, | Performed by: FAMILY MEDICINE

## 2023-01-15 PROCEDURE — 1159F MED LIST DOCD IN RCRD: CPT | Mod: CPTII,S$GLB,, | Performed by: FAMILY MEDICINE

## 2023-01-15 PROCEDURE — 3008F PR BODY MASS INDEX (BMI) DOCUMENTED: ICD-10-PCS | Mod: CPTII,S$GLB,, | Performed by: FAMILY MEDICINE

## 2023-01-15 PROCEDURE — 3008F BODY MASS INDEX DOCD: CPT | Mod: CPTII,S$GLB,, | Performed by: FAMILY MEDICINE

## 2023-01-15 PROCEDURE — 87651 POCT STREP A MOLECULAR: ICD-10-PCS | Mod: QW,S$GLB,, | Performed by: FAMILY MEDICINE

## 2023-01-15 PROCEDURE — 1160F PR REVIEW ALL MEDS BY PRESCRIBER/CLIN PHARMACIST DOCUMENTED: ICD-10-PCS | Mod: CPTII,S$GLB,, | Performed by: FAMILY MEDICINE

## 2023-01-15 PROCEDURE — 1159F PR MEDICATION LIST DOCUMENTED IN MEDICAL RECORD: ICD-10-PCS | Mod: CPTII,S$GLB,, | Performed by: FAMILY MEDICINE

## 2023-01-15 PROCEDURE — 87811 SARS CORONAVIRUS 2 ANTIGEN POCT, MANUAL READ: ICD-10-PCS | Mod: QW,S$GLB,, | Performed by: FAMILY MEDICINE

## 2023-01-15 PROCEDURE — 87502 INFLUENZA DNA AMP PROBE: CPT | Mod: QW,S$GLB,, | Performed by: FAMILY MEDICINE

## 2023-01-15 RX ORDER — BENZONATATE 100 MG/1
CAPSULE ORAL
Qty: 30 CAPSULE | Refills: 1 | Status: SHIPPED | OUTPATIENT
Start: 2023-01-15

## 2023-01-15 NOTE — PROGRESS NOTES
"handSubjective:       Patient ID: Noel Rodriguez is a 32 y.o. male.    Vitals:  height is 5' 9" (1.753 m) and weight is 81.6 kg (180 lb). His temperature is 98 °F (36.7 °C). His blood pressure is 128/85 and his pulse is 87. His respiration is 18 and oxygen saturation is 98%.     Chief Complaint: Cough    Pt presents a cough, congestion, and right shoulder and thumb pain.  Congestion started yesterday and Cough started this morning and comes and goes, dry. Symptoms getting worse. No Otc medication taken.     Right shoulder and thumb pain started over a month ago. Pt is unsure of the cause. 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 The current episode started today. The problem has been gradually worsening. The problem occurs every few minutes. The cough is Non-productive. Associated symptoms include chills, headaches, postnasal drip and a sore throat. Pertinent negatives include no ear pain, fever, shortness of breath or wheezing. He has tried nothing for the symptoms. His past medical history is significant for environmental allergies. There is no history of asthma, bronchiectasis, bronchitis, COPD or pneumonia.     Constitution: Positive for chills and generalized weakness. Negative for sweating, fatigue and fever.   HENT:  Positive for congestion, postnasal drip and sore throat. Negative for ear pain, sinus pain, sinus pressure and trouble swallowing.    Respiratory:  Positive for cough. Negative for chest tightness, sputum production, shortness of breath and wheezing.    Allergic/Immunologic: Positive for environmental allergies.   Neurological:  Positive for headaches.     Objective:      Physical Exam   Constitutional: He is oriented to person, place, and time. He appears well-developed. He is cooperative.  Non-toxic appearance. He does not appear ill. No distress.   HENT:   Head: Normocephalic and atraumatic.   Ears:   Right Ear: Hearing, tympanic membrane, external ear and " ear canal normal.   Left Ear: Hearing, tympanic membrane, external ear and ear canal normal.   Nose: Congestion present. No mucosal edema, rhinorrhea or nasal deformity. No epistaxis. Right sinus exhibits no maxillary sinus tenderness and no frontal sinus tenderness. Left sinus exhibits no maxillary sinus tenderness and no frontal sinus tenderness.   Mouth/Throat: Uvula is midline and mucous membranes are normal. Mucous membranes are moist. No trismus in the jaw. Normal dentition. No uvula swelling. Posterior oropharyngeal erythema present. No oropharyngeal exudate or posterior oropharyngeal edema.   Eyes: Conjunctivae and lids are normal. No scleral icterus.   Neck: Trachea normal and phonation normal. Neck supple. No edema present. No erythema present. No neck rigidity present.   Cardiovascular: Normal rate, regular rhythm, normal heart sounds and normal pulses.   Pulmonary/Chest: Effort normal and breath sounds normal. No respiratory distress. He has no decreased breath sounds. He has no rhonchi.   Abdominal: Normal appearance.   Musculoskeletal:         General: No deformity.      Comments: Right Shoulder Exam   Inspection/Observation   Swelling: absent  Bruising: absent   Scars: absent  Deformity: absent  Atrophy: absent   Tenderness   The patient is tender to palpation of the posterior SITs muscles  Range of Motion   Active abduction: 120  Passive abduction: 150   Extension: 50   Forward Flexion: 180   Forward Elevation: 180Adduction: 30   Internal rotation 0 degrees: T6   Internal rotation 90 degrees: 90   Tests & Signs   Apprehension: negative   Cross arm: positive for anterior pain  Drop arm: negative   Rotator Cuff Painful Arc/Range: mild to moderate  Drop arm: positive  Empty Can test: positive  Sensation: normal      Rt thumb   No open wounds or gross deformiity.  Right wrist non tender to palpation over distal radius.  Sensation intact to light touch to median, radial, and ulnar nerves  Able to  flex/extend wrist, make OK sign(this causes pain in middle phalanyx of thumb, give thumbs up, and cross fingers  Palpable radial pulse.  Negative Finkelsteins or tinnels at wrist      Lymphadenopathy:     He has cervical adenopathy.   Neurological: He is alert and oriented to person, place, and time. He exhibits normal muscle tone. Coordination normal.   Skin: Skin is warm, dry, intact, not diaphoretic and not pale.   Psychiatric: His speech is normal and behavior is normal. Judgment and thought content normal.   Nursing note and vitals reviewed.      Assessment:       1. Viral URI with cough    2. Cough, unspecified type    3. Pain of right hand    4. Right shoulder pain, unspecified chronicity    5. Strain of right rotator cuff capsule, initial encounter    6. Encounter to establish care    7. Sore throat          Plan:         Viral URI with cough    Cough, unspecified type  -     POCT Influenza A/B MOLECULAR  -     SARS Coronavirus 2 Antigen, POCT Manual Read    Pain of right hand  -     X-Ray Hand 3 view Right; Future; Expected date: 01/15/2023  -     Ambulatory referral/consult to Family Practice    Right shoulder pain, unspecified chronicity  -     X-ray Shoulder 2 or More Views Right; Future; Expected date: 01/15/2023  -     Ambulatory referral/consult to Family Practice    Strain of right rotator cuff capsule, initial encounter  -     Ambulatory referral/consult to Family Practice    Encounter to establish care  -     Ambulatory referral/consult to Family Practice    Sore throat  -     POCT Strep A, Molecular  -     (Magic mouthwash) 1:1:1 diphenhydrAMINE(Benadryl) 12.5mg/5ml liq, aluminum & magnesium hydroxide-simethicone (Maalox), LIDOcaine viscous 2%; Swish and spit 10 mLs every 4 (four) hours as needed (sore throat).  Dispense: 360 mL; Refill: 0    Other orders  -     benzonatate (TESSALON PERLES) 100 MG capsule; 1 or 2 every 8 hours prn cough  Dispense: 30 capsule; Refill: 1       Pt or guardian  provided educational materials and instructions regarding their visit diagnosis.

## 2023-01-15 NOTE — LETTER
January 15, 2023      [facility] [address] [address]  Phone: [phone]  Fax: [phone]       Patient: Noel Rodriguez   YOB: 1990  Date of Visit: 01/15/2023    To Whom It May Concern:    ENEDINA Rodriguez  was at [facility] on 01/15/2023 for Upper Respiratory Infection. The patient may return to work/school on 1/18/23 with no restrictions. If you have any questions or concerns, or if I can be of further assistance, please do not hesitate to contact me.    Sincerely,    Billie Roach, DO